# Patient Record
Sex: MALE | Race: WHITE
[De-identification: names, ages, dates, MRNs, and addresses within clinical notes are randomized per-mention and may not be internally consistent; named-entity substitution may affect disease eponyms.]

---
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
## 2020-10-16 ENCOUNTER — HOSPITAL ENCOUNTER (EMERGENCY)
Dept: HOSPITAL 46 - ED | Age: 20
Discharge: HOME | End: 2020-10-16
Payer: COMMERCIAL

## 2020-10-16 VITALS — WEIGHT: 250 LBS | BODY MASS INDEX: 30.44 KG/M2 | HEIGHT: 76 IN

## 2020-10-16 DIAGNOSIS — Z79.899: ICD-10-CM

## 2020-10-16 DIAGNOSIS — R00.2: Primary | ICD-10-CM

## 2020-10-16 DIAGNOSIS — F41.9: ICD-10-CM

## 2020-10-17 NOTE — EKG
[facility]
                                    [address]
                                  [place]  [address]
_________________________________________________________________________________________
                                                                 Signed   
 
 
Normal sinus rhythm with sinus arrhythmia
Normal ECG
No previous ECGs available
Confirmed by JENNIFER BUTTERFIELD DO ([id number]) on 10/17/2020 11:01:17 AM
 
 
 
 
 
 
 
 
 
 
 
 
 
 
 
 
 
 
 
 
 
 
 
 
 
 
 
 
 
 
 
 
 
 
 
 
 
 
 
 
 
    Electronically Signed By: JENNIFER BUTTERFIELD DO  10/17/20 1519
_________________________________________________________________________________________
PATIENT NAME:     WILI GUZMAN OLGAMINDYJOYCELYN                
MEDICAL RECORD #: [id number]                     Electrocardiogram             
          ACCT #: [id number]  
DATE OF BIRTH:   [birth year]                                       
PHYSICIAN:   JENNIFER BUTTERFIELD DO                     REPORT #: [id number]
REPORT IS CONFIDENTIAL AND NOT TO BE RELEASED WITHOUT AUTHORIZATION